# Patient Record
(demographics unavailable — no encounter records)

---

## 2024-10-23 NOTE — HISTORY OF PRESENT ILLNESS
[Home] : at home, [unfilled] , at the time of the visit. [Medical Office: ([facility])___] : at the medical office located in  [Verbal consent obtained from patient] : the patient, [unfilled] [de-identified] : 51 y/o female patient seen today via telehealth visit: - f/u obesity/weight management - currently on Wegovy 2.4 mg weekly and Topamax 50 mg daily states having intermittent bloating, but no N/V/D/C, helping decrease appetite, smaller portions, but feeling hungry at night/snacking, has been increasing physical activity still very minimal -Weight loss since last check: 3 lbs Total weight loss: 21 lbs

## 2024-11-11 NOTE — HISTORY OF PRESENT ILLNESS
[Home] : at home, [unfilled] , at the time of the visit. [Medical Office: ([facility])___] : at the medical office located in  [Verbal consent obtained from patient] : the patient, [unfilled] [de-identified] : Pt asks for teb for med renewal  Aware that she will need to come in for FBW next visit  Pt feels well on current meds

## 2024-11-11 NOTE — REVIEW OF SYSTEMS
[Fever] : no fever [Chills] : no chills [Chest Pain] : no chest pain [Palpitations] : no palpitations [Lower Ext Edema] : no lower extremity edema [Shortness Of Breath] : no shortness of breath [Cough] : no cough

## 2025-01-02 NOTE — HISTORY OF PRESENT ILLNESS
[Home] : at home, [unfilled] , at the time of the visit. [Medical Office: ([facility])___] : at the medical office located in  [Verbal consent obtained from patient] : the patient, [unfilled] [FreeTextEntry1] : med renewal [de-identified] : Pt requests TEB for med renewal of Xanax. No complaints at this time. Feels well on current dose.  No fevers/chills/CP/SOB/palpitations/leg swelling/abdominal pain/nausea/vomiting/diarrhea/recent illnesses/sick contacts.

## 2025-02-08 NOTE — HISTORY OF PRESENT ILLNESS
[FreeTextEntry1] : physical/med renewal [de-identified] : Pt comes in for adult annual wellness visit and labs and ekg and med renewal and vaccine. Still smoking. Has been on Wegovy 2.4mg sq qweekly for months and now gaining weight again. Does walking for exercise. Wants to start doing age-related screenings. Needs to make appt with GYN Dr. Orlin Bradford. Only complaint is chronic fatigue, not worse than usual. Wants to check vitamin levels. No fevers/chills/CP/SOB/palpitations/leg swelling/abdominal pain/nausea/vomiting/diarrhea/recent illnesses/sick contacts.

## 2025-02-08 NOTE — COUNSELING
[Yes] : Risk of tobacco use and health benefits of smoking cessation discussed: Yes [Cessation strategies including cessation program discussed] : Cessation strategies including cessation program discussed [Use of nicotine replacement therapies and other medications discussed] : Use of nicotine replacement therapies and other medications discussed [No] : Not willing to quit smoking [ - Annual Lung Cancer Screening/Share Decision Making Discussion] : Annual Lung Cancer Screening/Share Decision Making Discussion. (I have advised this patient to have a Low Dose CT (LDCT) scan of the lungs and have discussed the following with the patient in a shared decision making discussion:   Benefits of Detection and Early Treatment: There is adequate evidence that annual screening for lung cancer with LDCT in a population of high-risk persons can prevent a substantial number of lung cancer-related deaths. The magnitude of benefit depends on the individual patient's risk for lung cancer, as those who are at highest risk are most likely to benefit. Screening cannot prevent most lung cancer-related deaths, and does not replace smoking cessation. Harms of Detection and Early Intervention and Treatment: The harms associated with LDCT screening include false-negative and false-positive results, incidental findings, over diagnosis, and radiation exposure. False-positive LDCT results occur in a substantial proportion of screened persons; 95% of all positive results do not lead to a diagnosis of cancer. In a high-quality screening program, further imaging can resolve most false-positive results; however, some patients may require invasive procedures. Radiation harms, including cancer resulting from cumulative exposure to radiation, vary depending on the age at the start of screening; the number of scans received; and the person's exposure to other sources of radiation, particularly other medical imaging.) [Good understanding] : Patient has a good understanding of lifestyle changes and steps needed to achieve self management goal [FreeTextEntry2] : refused intervention

## 2025-02-08 NOTE — ASSESSMENT
[FreeTextEntry1] : 1) HTN 2) HLD bp 138/88 Advised to get BP cuff. Instructed how to check BP with home machine with legs uncrossed, back straight, arm at heart level and record. Advised to start Benicar 10mg qd f/u cmp rtc in 2-4 weeks for bp check and to repeat labs EKG obtained to assist in diagnosis and management of assessed problem(s). NSR rate 77 old anteroseptal infarct unchanged from previous no acute ST changes.  5 minutes were spent administering an evidence-based ASCVD Risk Assessment tool showing patient's risk being calculated as 3.44% and discussing the results with patient including lifestyle modifications to be taken as well as treatment options with statin medications. C/w Crestor 5mg qhs.  3) BMI >27 not achieving much more weight loss with wegovy at max dose 2.4mg sq qweekly No side effects of stomach pain, nausea, vomiting, eructation, diarrhea, constipation, skin itching, rash, or redness, stomach fullness, swelling of the face, throat, or tongue, vomiting, yellow eyes or skin. Wants to switch to Zepbound. Currently there are no contraindications for the use of Zepbound after reviewing the pt's medical history and labs, including personal and family history of thyroid cancer or MEN 2 Syndrome. Possible side effects of Zepbound were discussed with the patient, including nausea, abdominal pain, reflux, constipation, delayed gastric emptying, gallstones, kidney stones, visual changes, and pancreatitis. Pt verbalizes understanding and wishes to proceed with medical therapy. Pt understands the need for long-term use and understands the risk of weight-gain upon stopping weight loss medications. Will work on PA for Zepbound 2.5mg sq qweekly.  4) Current smoker >20PY not interested in quitting discussed LDCT for lung cancer screening - ordered  5) Anxiety renewed lexapro ISTOP reviewed and confirmed by me; no evidence of abuse/diversion. Renewed Xanax.  6) Screening for osteoporosis DEXA ordered- post-menopausal female, smoker check 25-oh vit d  7) Chronic fatigue improved after losing weight but now gaining weight again wants to check "vitamin levels"- sent vit d, b12, folate; add tsh, a1c needs to increase exercise   8) Screening for breast cancer/h/o fibrocystic breasts mammo/breast US ordered  9) HCM refused flu shot PCV 20 given today d/t smoking history does not want to do both PCV and Shingrix today, will do Shingrix next visit labs drawn Cologuard ordered says she will make appt with GYN Dr. Orlin Bradford for pap f/u in 1 month

## 2025-02-08 NOTE — REVIEW OF SYSTEMS
[Negative] : Heme/Lymph [Fever] : no fever [Fatigue] : fatigue [Hot Flashes] : no hot flashes [Night Sweats] : no night sweats [Recent Change In Weight] : ~T recent weight change [FreeTextEntry2] : gaining weight

## 2025-02-08 NOTE — PHYSICAL EXAM
[No Acute Distress] : no acute distress [Well Nourished] : well nourished [Well Developed] : well developed [Well-Appearing] : well-appearing [Normal Sclera/Conjunctiva] : normal sclera/conjunctiva [PERRL] : pupils equal round and reactive to light [EOMI] : extraocular movements intact [Normal Outer Ear/Nose] : the outer ears and nose were normal in appearance [Normal Oropharynx] : the oropharynx was normal [No JVD] : no jugular venous distention [No Lymphadenopathy] : no lymphadenopathy [Supple] : supple [Thyroid Normal, No Nodules] : the thyroid was normal and there were no nodules present [No Respiratory Distress] : no respiratory distress  [No Accessory Muscle Use] : no accessory muscle use [Clear to Auscultation] : lungs were clear to auscultation bilaterally [Normal Rate] : normal rate  [Regular Rhythm] : with a regular rhythm [Normal S1, S2] : normal S1 and S2 [No Murmur] : no murmur heard [No Carotid Bruits] : no carotid bruits [No Abdominal Bruit] : a ~M bruit was not heard ~T in the abdomen [No Varicosities] : no varicosities [Pedal Pulses Present] : the pedal pulses are present [No Edema] : there was no peripheral edema [No Palpable Aorta] : no palpable aorta [No Extremity Clubbing/Cyanosis] : no extremity clubbing/cyanosis [Soft] : abdomen soft [Non Tender] : non-tender [Non-distended] : non-distended [No Masses] : no abdominal mass palpated [No HSM] : no HSM [Normal Bowel Sounds] : normal bowel sounds [Normal Posterior Cervical Nodes] : no posterior cervical lymphadenopathy [Normal Anterior Cervical Nodes] : no anterior cervical lymphadenopathy [No CVA Tenderness] : no CVA  tenderness [No Spinal Tenderness] : no spinal tenderness [No Joint Swelling] : no joint swelling [Grossly Normal Strength/Tone] : grossly normal strength/tone [No Rash] : no rash [Coordination Grossly Intact] : coordination grossly intact [No Focal Deficits] : no focal deficits [Normal Gait] : normal gait [Deep Tendon Reflexes (DTR)] : deep tendon reflexes were 2+ and symmetric [Normal Affect] : the affect was normal [Normal Insight/Judgement] : insight and judgment were intact [de-identified] : oeverweight

## 2025-02-08 NOTE — HEALTH RISK ASSESSMENT
[Yes] : Yes [2 - 4 times a month (2 pts)] : 2-4 times a month (2 points) [1 or 2 (0 pts)] : 1 or 2 (0 points) [Never (0 pts)] : Never (0 points) [No] : In the past 12 months have you used drugs other than those required for medical reasons? No [No falls in past year] : Patient reported no falls in the past year [Little interest or pleasure doing things] : 1) Little interest or pleasure doing things [Feeling down, depressed, or hopeless] : 2) Feeling down, depressed, or hopeless [0] : 2) Feeling down, depressed, or hopeless: Not at all (0) [PHQ-2 Negative - No further assessment needed] : PHQ-2 Negative - No further assessment needed [Current] : Current [20 or more] : 20 or more [NO] : No [Patient declined mammogram] : Patient declined mammogram [Patient declined PAP Smear] : Patient declined PAP Smear [Patient declined bone density test] : Patient declined bone density test [Patient declined colonoscopy] : Patient declined colonoscopy [HIV test declined] : HIV test declined [Hepatitis C test declined] : Hepatitis C test declined [Change in mental status noted] : Change in mental status noted [None] : None [With Family] : lives with family [# of Members in Household ___] :  household currently consist of [unfilled] member(s) [Employed] : employed [] :  [# Of Children ___] : has [unfilled] children [Sexually Active] : sexually active [Feels Safe at Home] : Feels safe at home [Fully functional (bathing, dressing, toileting, transferring, walking, feeding)] : Fully functional (bathing, dressing, toileting, transferring, walking, feeding) [Fully functional (using the telephone, shopping, preparing meals, housekeeping, doing laundry, using] : Fully functional and needs no help or supervision to perform IADLs (using the telephone, shopping, preparing meals, housekeeping, doing laundry, using transportation, managing medications and managing finances) [Reports normal functional visual acuity (ie: able to read med bottle)] : Reports normal functional visual acuity [Audit-CScore] : 2 [de-identified] : walking all day [de-identified] : "I don't eat very much"- skips breakfast, 3pm will have dinner (chicken, veggies); snacking 6-7pm  [[id number]] : 0 [Language] : denies difficulty with language [Behavior] : denies difficulty with behavior [Learning/Retaining New Information] : denies difficulty learning/retaining new information [Handling Complex Tasks] : denies difficulty handling complex tasks [Reasoning] : denies difficulty with reasoning [Spatial Ability and Orientation] : denies difficulty with spatial ability and orientation [High Risk Behavior] : no high risk behavior [Reports changes in hearing] : Reports no changes in hearing [Reports changes in vision] : Reports no changes in vision [Reports changes in dental health] : Reports no changes in dental health [MammogramComments] : ordered [PapSmearComments] : needs to make appt with Dr. Orlin Bradford [BoneDensityComments] : ordered [ColonoscopyComments] : cologuard [de-identified] : little forgetful since menopause  [de-identified] : uses readers

## 2025-02-15 NOTE — HISTORY OF PRESENT ILLNESS
[FreeTextEntry1] : b12 injection [de-identified] : Pt comes in for first b12 injection. No complaints at this time.

## 2025-02-22 NOTE — HISTORY OF PRESENT ILLNESS
[FreeTextEntry8] : Pt initially coming in for 2nd b12 injection but says over the past week she has had left lower front tooth pain radiating to left ear. Her dentist is away on vacation so hasn't been able to get in to see her. No fevers/chills. No cough/chest pain/SOB.

## 2025-02-22 NOTE — REVIEW OF SYSTEMS
[Earache] : earache [Hearing Loss] : no hearing loss [Nosebleed] : no nosebleeds [Hoarseness] : no hoarseness [Nasal Discharge] : no nasal discharge [Sore Throat] : no sore throat [Postnasal Drip] : no postnasal drip [Negative] : Heme/Lymph [FreeTextEntry4] : as above

## 2025-02-22 NOTE — ASSESSMENT
[FreeTextEntry1] : T 97.2F P 92 S 97% on RA at rest /84 tooth needs to come out; advised urgent dental eval, patient refused, says will see dentist this week; in meantime will give augmentin 875mg bid for 7-14 days but tooth absolutely needs to be removed and dental eval necessary to r/o abscess; check cbc with diff repeat Cr, K haven't checked since starting olmesartan 2nd b12 injection given

## 2025-02-22 NOTE — PHYSICAL EXAM
[No Acute Distress] : no acute distress [Well Nourished] : well nourished [Well Developed] : well developed [Well-Appearing] : well-appearing [Normal Sclera/Conjunctiva] : normal sclera/conjunctiva [PERRL] : pupils equal round and reactive to light [EOMI] : extraocular movements intact [Normal Outer Ear/Nose] : the outer ears and nose were normal in appearance [No JVD] : no jugular venous distention [No Lymphadenopathy] : no lymphadenopathy [Supple] : supple [Thyroid Normal, No Nodules] : the thyroid was normal and there were no nodules present [No Respiratory Distress] : no respiratory distress  [No Accessory Muscle Use] : no accessory muscle use [Clear to Auscultation] : lungs were clear to auscultation bilaterally [Normal Rate] : normal rate  [Regular Rhythm] : with a regular rhythm [Normal S1, S2] : normal S1 and S2 [No Murmur] : no murmur heard [No Carotid Bruits] : no carotid bruits [No Abdominal Bruit] : a ~M bruit was not heard ~T in the abdomen [No Varicosities] : no varicosities [Pedal Pulses Present] : the pedal pulses are present [No Edema] : there was no peripheral edema [No Palpable Aorta] : no palpable aorta [No Extremity Clubbing/Cyanosis] : no extremity clubbing/cyanosis [Soft] : abdomen soft [Non Tender] : non-tender [Non-distended] : non-distended [No Masses] : no abdominal mass palpated [No HSM] : no HSM [Normal Bowel Sounds] : normal bowel sounds [Normal Posterior Cervical Nodes] : no posterior cervical lymphadenopathy [Normal Anterior Cervical Nodes] : no anterior cervical lymphadenopathy [No CVA Tenderness] : no CVA  tenderness [No Spinal Tenderness] : no spinal tenderness [No Joint Swelling] : no joint swelling [Grossly Normal Strength/Tone] : grossly normal strength/tone [No Rash] : no rash [Coordination Grossly Intact] : coordination grossly intact [No Focal Deficits] : no focal deficits [Normal Gait] : normal gait [Deep Tendon Reflexes (DTR)] : deep tendon reflexes were 2+ and symmetric [Normal Affect] : the affect was normal [Normal Insight/Judgement] : insight and judgment were intact [de-identified] : left lower lateral incisor with infection/inflammation no obvious abscess gum erosion

## 2025-03-29 NOTE — HISTORY OF PRESENT ILLNESS
[FreeTextEntry1] : b12 shot, labs, med renewal [de-identified] : Pt comes in for 3rd b12 shot and labs and bp check and med renewal. Her rash on right and left ankles not improved despite trying antifungal. No pain. Lost a bit of weight on 2.5mg zepbound. No side effects of stomach pain, nausea, vomiting, eructation, diarrhea, constipation, skin itching, rash, or redness, stomach fullness, swelling of the face, throat, or tongue, vomiting, yellow eyes or skin.

## 2025-03-29 NOTE — PHYSICAL EXAM
[No Acute Distress] : no acute distress [Well Nourished] : well nourished [Well Developed] : well developed [Well-Appearing] : well-appearing [Normal Sclera/Conjunctiva] : normal sclera/conjunctiva [PERRL] : pupils equal round and reactive to light [EOMI] : extraocular movements intact [Normal Outer Ear/Nose] : the outer ears and nose were normal in appearance [Normal Oropharynx] : the oropharynx was normal [No JVD] : no jugular venous distention [No Lymphadenopathy] : no lymphadenopathy [Supple] : supple [Thyroid Normal, No Nodules] : the thyroid was normal and there were no nodules present [No Respiratory Distress] : no respiratory distress  [No Accessory Muscle Use] : no accessory muscle use [Clear to Auscultation] : lungs were clear to auscultation bilaterally [Normal Rate] : normal rate  [Regular Rhythm] : with a regular rhythm [Normal S1, S2] : normal S1 and S2 [No Murmur] : no murmur heard [No Carotid Bruits] : no carotid bruits [No Abdominal Bruit] : a ~M bruit was not heard ~T in the abdomen [No Varicosities] : no varicosities [Pedal Pulses Present] : the pedal pulses are present [No Edema] : there was no peripheral edema [No Palpable Aorta] : no palpable aorta [No Extremity Clubbing/Cyanosis] : no extremity clubbing/cyanosis [Soft] : abdomen soft [Non Tender] : non-tender [Non-distended] : non-distended [No Masses] : no abdominal mass palpated [No HSM] : no HSM [Normal Bowel Sounds] : normal bowel sounds [Normal Posterior Cervical Nodes] : no posterior cervical lymphadenopathy [Normal Anterior Cervical Nodes] : no anterior cervical lymphadenopathy [No CVA Tenderness] : no CVA  tenderness [No Spinal Tenderness] : no spinal tenderness [No Joint Swelling] : no joint swelling [Grossly Normal Strength/Tone] : grossly normal strength/tone [Coordination Grossly Intact] : coordination grossly intact [No Focal Deficits] : no focal deficits [Normal Gait] : normal gait [Deep Tendon Reflexes (DTR)] : deep tendon reflexes were 2+ and symmetric [Normal Affect] : the affect was normal [Normal Insight/Judgement] : insight and judgment were intact [de-identified] : mildly overweight  [de-identified] : well-circumscribed rash raised distinct margins 2 on right ankle one on left

## 2025-05-29 NOTE — REVIEW OF SYSTEMS
[Fever] : no fever [Chills] : no chills [Fatigue] : no fatigue [Hot Flashes] : no hot flashes [Night Sweats] : no night sweats [Recent Change In Weight] : ~T recent weight change [Abdominal Pain] : no abdominal pain [Nausea] : no nausea [Constipation] : constipation [Diarrhea] : diarrhea [Vomiting] : no vomiting [Heartburn] : no heartburn [Melena] : no melena [Negative] : Heme/Lymph

## 2025-05-29 NOTE — ASSESSMENT
[FreeTextEntry1] : increase zepbound to 7.5mg sq qweekly can try miralax/Calm magnesium prn for constipation (does not bother her) ISTOP reviewed and confirmed by me; no evidence of abuse/diversion. Renewed xanax renewed rest of meds needs FBW in 1-2 months  needs to increase exercise again advised smoking cessation LDCT lung cancer screen mammo [place] she says she is nervous to do them but will try to make an effort

## 2025-05-29 NOTE — HISTORY OF PRESENT ILLNESS
[Home] : at home, [unfilled] , at the time of the visit. [Medical Office: ([facility])___] : at the medical office located in  [Telehealth (audio & video)] : This visit was provided via telehealth using real-time 2-way audio visual technology. [Verbal consent obtained from patient] : the patient, [unfilled] [FreeTextEntry1] : med renewal [de-identified] : Pt requests TEB for med renewal of Zepbound and Xanax. Tolerating Zepbound 5mg sq qweekly wants to increase. Down to 163lbs from 167lbs last month, goal weight 140lbs. Walking for exercise. Gets mild constipation but magnesium helps. No abd pain/nausea/vomiting. Still smoking. Hasn't gone for dexa cologuard mammo ct chest.

## 2025-06-19 NOTE — HISTORY OF PRESENT ILLNESS
[Home] : at home, [unfilled] , at the time of the visit. [Medical Office: ([facility])___] : at the medical office located in  [Telehealth (audio & video)] : This visit was provided via telehealth using real-time 2-way audio visual technology. [Verbal consent obtained from patient] : the patient, [unfilled] [FreeTextEntry1] : med renewal [de-identified] : Pt requests TEB for med renewal of Zepbound. Tolerating 7.5mg well. No side effects of stomach pain, nausea, vomiting, eructation, diarrhea, constipation, skin itching, rash, or redness, stomach fullness, swelling of the face, throat, or tongue, vomiting, yellow eyes or skin. Limited weight loss, now 162 from 163lbs last month. Wants to increase. Also needs refill of ibuprofen for right shoulder OA.

## 2025-06-19 NOTE — ASSESSMENT
[FreeTextEntry1] : increase zepbound to 10mg sq qweekly needs to increase exercise again advised smoking cessation LDCT lung cancer screen mammo [place] she says she is nervous to do them but will try to make an effort. renewed ibuprofen